# Patient Record
Sex: MALE | Race: WHITE | HISPANIC OR LATINO | Employment: FULL TIME | URBAN - METROPOLITAN AREA
[De-identification: names, ages, dates, MRNs, and addresses within clinical notes are randomized per-mention and may not be internally consistent; named-entity substitution may affect disease eponyms.]

---

## 2024-03-14 ENCOUNTER — APPOINTMENT (OUTPATIENT)
Dept: CT IMAGING | Facility: HOSPITAL | Age: 64
End: 2024-03-14
Payer: MEDICAID

## 2024-03-14 ENCOUNTER — HOSPITAL ENCOUNTER (EMERGENCY)
Facility: HOSPITAL | Age: 64
Discharge: HOME | End: 2024-03-14
Attending: EMERGENCY MEDICINE | Admitting: EMERGENCY MEDICINE
Payer: MEDICAID

## 2024-03-14 VITALS
WEIGHT: 149.69 LBS | SYSTOLIC BLOOD PRESSURE: 140 MMHG | RESPIRATION RATE: 18 BRPM | DIASTOLIC BLOOD PRESSURE: 86 MMHG | HEART RATE: 77 BPM | OXYGEN SATURATION: 97 % | BODY MASS INDEX: 24.06 KG/M2 | TEMPERATURE: 98.3 F | HEIGHT: 66 IN

## 2024-03-14 DIAGNOSIS — R10.13 EPIGASTRIC PAIN: Primary | ICD-10-CM

## 2024-03-14 DIAGNOSIS — K80.20 GALLSTONES: ICD-10-CM

## 2024-03-14 LAB
ALBUMIN SERPL-MCNC: 4.7 G/DL (ref 3.5–5.2)
ALBUMIN/GLOB SERPL: 1.7 G/DL
ALP SERPL-CCNC: 79 U/L (ref 39–117)
ALT SERPL W P-5'-P-CCNC: 18 U/L (ref 1–41)
ANION GAP SERPL CALCULATED.3IONS-SCNC: 15 MMOL/L (ref 5–15)
AST SERPL-CCNC: 28 U/L (ref 1–40)
BASOPHILS # BLD AUTO: 0.04 10*3/MM3 (ref 0–0.2)
BASOPHILS NFR BLD AUTO: 0.3 % (ref 0–1.5)
BILIRUB SERPL-MCNC: 0.7 MG/DL (ref 0–1.2)
BUN SERPL-MCNC: 17 MG/DL (ref 8–23)
BUN/CREAT SERPL: 19.3 (ref 7–25)
CALCIUM SPEC-SCNC: 9.4 MG/DL (ref 8.6–10.5)
CHLORIDE SERPL-SCNC: 98 MMOL/L (ref 98–107)
CO2 SERPL-SCNC: 28 MMOL/L (ref 22–29)
CREAT SERPL-MCNC: 0.88 MG/DL (ref 0.76–1.27)
DEPRECATED RDW RBC AUTO: 39.2 FL (ref 37–54)
EGFRCR SERPLBLD CKD-EPI 2021: 96 ML/MIN/1.73
EOSINOPHIL # BLD AUTO: 0.02 10*3/MM3 (ref 0–0.4)
EOSINOPHIL NFR BLD AUTO: 0.2 % (ref 0.3–6.2)
ERYTHROCYTE [DISTWIDTH] IN BLOOD BY AUTOMATED COUNT: 13.1 % (ref 12.3–15.4)
GLOBULIN UR ELPH-MCNC: 2.8 GM/DL
GLUCOSE SERPL-MCNC: 117 MG/DL (ref 65–99)
HCT VFR BLD AUTO: 47.2 % (ref 37.5–51)
HGB BLD-MCNC: 15.2 G/DL (ref 13–17.7)
HOLD SPECIMEN: NORMAL
IMM GRANULOCYTES # BLD AUTO: 0.06 10*3/MM3 (ref 0–0.05)
IMM GRANULOCYTES NFR BLD AUTO: 0.5 % (ref 0–0.5)
LIPASE SERPL-CCNC: 23 U/L (ref 13–60)
LYMPHOCYTES # BLD AUTO: 1.39 10*3/MM3 (ref 0.7–3.1)
LYMPHOCYTES NFR BLD AUTO: 10.7 % (ref 19.6–45.3)
MCH RBC QN AUTO: 26.6 PG (ref 26.6–33)
MCHC RBC AUTO-ENTMCNC: 32.2 G/DL (ref 31.5–35.7)
MCV RBC AUTO: 82.7 FL (ref 79–97)
MONOCYTES # BLD AUTO: 0.8 10*3/MM3 (ref 0.1–0.9)
MONOCYTES NFR BLD AUTO: 6.1 % (ref 5–12)
NEUTROPHILS NFR BLD AUTO: 10.73 10*3/MM3 (ref 1.7–7)
NEUTROPHILS NFR BLD AUTO: 82.2 % (ref 42.7–76)
NRBC BLD AUTO-RTO: 0 /100 WBC (ref 0–0.2)
PLATELET # BLD AUTO: 234 10*3/MM3 (ref 140–450)
PMV BLD AUTO: 9 FL (ref 6–12)
POTASSIUM SERPL-SCNC: 3.5 MMOL/L (ref 3.5–5.2)
PROT SERPL-MCNC: 7.5 G/DL (ref 6–8.5)
RBC # BLD AUTO: 5.71 10*6/MM3 (ref 4.14–5.8)
SODIUM SERPL-SCNC: 141 MMOL/L (ref 136–145)
WBC NRBC COR # BLD AUTO: 13.04 10*3/MM3 (ref 3.4–10.8)

## 2024-03-14 PROCEDURE — 83690 ASSAY OF LIPASE: CPT | Performed by: NURSE PRACTITIONER

## 2024-03-14 PROCEDURE — 74176 CT ABD & PELVIS W/O CONTRAST: CPT

## 2024-03-14 PROCEDURE — 96374 THER/PROPH/DIAG INJ IV PUSH: CPT

## 2024-03-14 PROCEDURE — 85025 COMPLETE CBC W/AUTO DIFF WBC: CPT | Performed by: NURSE PRACTITIONER

## 2024-03-14 PROCEDURE — 99284 EMERGENCY DEPT VISIT MOD MDM: CPT

## 2024-03-14 PROCEDURE — 96375 TX/PRO/DX INJ NEW DRUG ADDON: CPT

## 2024-03-14 PROCEDURE — 25010000002 KETOROLAC TROMETHAMINE PER 15 MG: Performed by: PHYSICIAN ASSISTANT

## 2024-03-14 PROCEDURE — 80053 COMPREHEN METABOLIC PANEL: CPT | Performed by: NURSE PRACTITIONER

## 2024-03-14 PROCEDURE — 93005 ELECTROCARDIOGRAM TRACING: CPT | Performed by: PHYSICIAN ASSISTANT

## 2024-03-14 RX ORDER — KETOROLAC TROMETHAMINE 30 MG/ML
15 INJECTION, SOLUTION INTRAMUSCULAR; INTRAVENOUS ONCE
Status: COMPLETED | OUTPATIENT
Start: 2024-03-14 | End: 2024-03-14

## 2024-03-14 RX ORDER — OMEPRAZOLE 40 MG/1
40 CAPSULE, DELAYED RELEASE ORAL DAILY
Qty: 14 CAPSULE | Refills: 0 | Status: SHIPPED | OUTPATIENT
Start: 2024-03-14

## 2024-03-14 RX ORDER — ONDANSETRON 2 MG/ML
4 INJECTION INTRAMUSCULAR; INTRAVENOUS ONCE
Status: DISCONTINUED | OUTPATIENT
Start: 2024-03-14 | End: 2024-03-14

## 2024-03-14 RX ORDER — FAMOTIDINE 10 MG/ML
20 INJECTION, SOLUTION INTRAVENOUS ONCE
Status: COMPLETED | OUTPATIENT
Start: 2024-03-14 | End: 2024-03-14

## 2024-03-14 RX ORDER — METOCLOPRAMIDE 5 MG/1
5 TABLET ORAL 3 TIMES DAILY PRN
Qty: 20 TABLET | Refills: 0 | Status: SHIPPED | OUTPATIENT
Start: 2024-03-14

## 2024-03-14 RX ORDER — SODIUM CHLORIDE 0.9 % (FLUSH) 0.9 %
10 SYRINGE (ML) INJECTION AS NEEDED
Status: DISCONTINUED | OUTPATIENT
Start: 2024-03-14 | End: 2024-03-14 | Stop reason: HOSPADM

## 2024-03-14 RX ADMIN — FAMOTIDINE 20 MG: 10 INJECTION INTRAVENOUS at 20:19

## 2024-03-14 RX ADMIN — KETOROLAC TROMETHAMINE 15 MG: 30 INJECTION, SOLUTION INTRAMUSCULAR at 20:27

## 2024-03-14 NOTE — ED PROVIDER NOTES
Subjective   Provider in Triage Note  63-year-old male presents with family English-speaking and is okay with family translating with complaints of epigastric right upper quadrant pain.  [place] but states in [place] did have an ultrasound showing gallstones.  Worse after eating.  Denies any vomiting diarrhea or fever.    DAUGHTER SIMRAN IS NURSE PRACTITIONER AND PROVIDES ADDITIONAL HX FOR PATIENT    Due to significant overcrowding in the emergency department patient was initially seen and evaluated in triage.  Provider in triage recommended patient placement in the treatment area to initiate therapy and movement to an ER bed as soon as possible.   Orders placed; medications will be deferred to main provider per protocol.        History of Present Illness  Reviewed Pit note and agree.  No reports of chest pain or shortness of breath.  No pertinent abdominal surgical history.  Patient's daughter states that his diet has not been that great since coming to the [facility]        Review of Systems   Constitutional: Negative.    Respiratory: Negative.     Cardiovascular: Negative.    Gastrointestinal:  Positive for abdominal pain. Negative for abdominal distention, blood in stool, constipation, diarrhea, nausea and vomiting.   Genitourinary:  Negative for decreased urine volume, difficulty urinating, dysuria, flank pain, frequency, hematuria and urgency.   Musculoskeletal:  Negative for back pain, neck pain and neck stiffness.   Skin: Negative.    Neurological: Negative.        No past medical history on file.    No Known Allergies    No past surgical history on file.    No family history on file.    Social History     Socioeconomic History    Marital status:            Objective   Physical Exam  Vitals and nursing note reviewed.   Constitutional:       General: He is not in acute distress.     Appearance: Normal appearance. He is well-developed. He is not ill-appearing, toxic-appearing  "or diaphoretic.   HENT:      Head: Normocephalic and atraumatic.      Mouth/Throat:      Mouth: Mucous membranes are moist.      Pharynx: Oropharynx is clear.   Eyes:      General: No scleral icterus.     Extraocular Movements: Extraocular movements intact.      Pupils: Pupils are equal, round, and reactive to light.   Cardiovascular:      Rate and Rhythm: Normal rate and regular rhythm.      Heart sounds: No murmur heard.     No friction rub. No gallop.   Pulmonary:      Effort: Pulmonary effort is normal. No tachypnea or accessory muscle usage.      Breath sounds: No stridor. No decreased breath sounds, wheezing, rhonchi or rales.   Chest:      Chest wall: No mass, deformity, tenderness or crepitus.   Abdominal:      General: Bowel sounds are normal.      Palpations: Abdomen is soft. There is no mass.      Tenderness: There is abdominal tenderness in the right upper quadrant and epigastric area. There is no right CVA tenderness, left CVA tenderness, guarding or rebound.      Hernia: No hernia is present.   Skin:     General: Skin is warm.      Capillary Refill: Capillary refill takes less than 2 seconds.      Findings: No rash.   Neurological:      General: No focal deficit present.      Mental Status: He is alert and oriented to person, place, and time.   Psychiatric:         Mood and Affect: Mood normal.         Behavior: Behavior normal.         Procedures           ED Course    /72   Pulse 62   Temp 98.3 °F (36.8 °C) (Oral)   Resp 18   Ht 167 cm (65.75\")   Wt 67.9 kg (149 lb 11.1 oz)   SpO2 97%   BMI 24.35 kg/m²   Medications   sodium chloride 0.9 % flush 10 mL (has no administration in time range)   famotidine (PEPCID) injection 20 mg (20 mg Intravenous Given 3/14/24 2019)   ketorolac (TORADOL) injection 15 mg (15 mg Intravenous Given 3/14/24 2027)     Labs Reviewed   COMPREHENSIVE METABOLIC PANEL - Abnormal; Notable for the following components:       Result Value    Glucose 117 (*)     All " other components within normal limits    Narrative:     GFR Normal >60  Chronic Kidney Disease <60  Kidney Failure <15     CBC WITH AUTO DIFFERENTIAL - Abnormal; Notable for the following components:    WBC 13.04 (*)     Neutrophil % 82.2 (*)     Lymphocyte % 10.7 (*)     Eosinophil % 0.2 (*)     Neutrophils, Absolute 10.73 (*)     Immature Grans, Absolute 0.06 (*)     All other components within normal limits   LIPASE - Normal   CBC AND DIFFERENTIAL    Narrative:     The following orders were created for panel order CBC & Differential.  Procedure                               Abnormality         Status                     ---------                               -----------         ------                     CBC Auto Differential[[id number]]        Abnormal            Final result                 Please view results for these tests on the individual orders.   EXTRA TUBES    Narrative:     The following orders were created for panel order Extra Tubes.  Procedure                               Abnormality         Status                     ---------                               -----------         ------                     Gold Top - SST[[id number]]                                   Final result                 Please view results for these tests on the individual orders.   GOLD TOP - SST     CT Abdomen Pelvis Without Contrast   Final Result   Impression:      1. Cholelithiasis. No CT evidence of acute cholecystitis or biliary tract obstruction.   2. No acute process seen within the abdomen or pelvis.                  Electronically Signed: Jameson Reese MD     3/14/2024 6:50 PM EDT     Workstation ID: [id number]                                                 Medical Decision Making    Differentials: Acute cholecystitis, intra-abdominal infection, bowel obstruction, ACS, gastroenteritis, pancreatitis     ;this list is not all inclusive and does not constitute the entirety of considered causes  Labs: As above  EKG:  Reviewed by myself interpreted by Dr. Shaikh shows sinus rhythm rate 69 left axis deviation old posterior infarct previous EKG not available  Radiology: My interpretation no obvious bowel obstruction on CT abdomen pelvis correlated with radiologist interpretation as below  CT Abdomen Pelvis Without Contrast   Final Result    Impression:    1. Cholelithiasis. No CT evidence of acute cholecystitis or biliary tract obstruction.    2. No acute process seen within the abdomen or pelvis.        Electronically Signed: Jameson Reese MD      3/14/2024 6:50 PM EDT      Workstation ID: [id number]     Disposition/Treatment:  Appropriate PPE was worn during exam and throughout all encounters with the patient.  Upon arrival to the ED patient was afebrile.  Nontoxic presented with complaints of epigastric and right upper quadrant abdominal pain.  IV was placed and labs were obtained.  Patient was initially seen by another provider in triage reassessed by myself and ED bed.  Patient presents with epigastric and right upper quadrant abdominal pain.  Patient has had the symptoms in the past was diagnosed with gallstones.  No reports of new fever, nausea, or vomiting.  EKG obtained which showed no acute STEMI.  Labs and imaging also obtained.    CBC showed white count of 13 no anemia.  Metabolic panel unremarkable including normal liver function test.  Lipase normal.  CT abdomen pelvis showed gallstones otherwise no acute infection or obstruction including acute cholecystitis.  Patient was given Pepcid and Toradol while in the ED (initially Dilaudid was ordered but patient declined) with resolution of pain.  Upon reassessment patient is resting comfortably findings were discussed with the patient and family at bedside there is no signs of acute abdomen or peritonitis on today's exam.  Patient will be discharged advised to follow-up with general surgery and GI for further management of his abdominal pain.  Patient will be given Reglan  and omeprazole for home.  All questions were answered.    This document is intended for medical expert use only. Reading of this document by patients and/or patient's family without participating medical staff guidance may result in misinterpretation and unintended morbidity.  Any interpretation of such data is the responsibility of the patient and/or family member responsible for the patient in concert with their primary or specialist providers, not to be left for sources of online searches such as [employer], [employer] or similar queries. Relying on these approaches to knowledge may result in misinterpretation, misguided goals of care and even death should patients or family members try recommendations outside of the realm of professional medical care in a supervised inpatient environment.       Problems Addressed:  Epigastric pain: complicated acute illness or injury  Gallstones: complicated acute illness or injury    Amount and/or Complexity of Data Reviewed  Labs: ordered. Decision-making details documented in ED Course.  Radiology: ordered. Decision-making details documented in ED Course.  ECG/medicine tests: ordered.    Risk  Prescription drug management.        Final diagnoses:   Epigastric pain   Gallstones       ED Disposition  ED Disposition       ED Disposition   Discharge    Condition   Stable    Comment   --               [facility] EMERGENCY DEPARTMENT  [address] [facility] [address] [phone]  Go to   If symptoms worsen    PATIENT CONNECTION - [facility] [address] [phone]  Call   If you do not have a primary care provider    Lynsey Vinson MD  [address] [address] [phone]    Schedule an appointment as soon as possible for a visit       [facility]  [address] [facility] [address] [phone]  Schedule an appointment as soon as possible for a visit            Medication List        New  Prescriptions      metoclopramide 5 MG tablet  Commonly known as: REGLAN  Take 1 tablet by mouth 3 (Three) Times a Day As Needed (nausea).     omeprazole 40 MG capsule  Commonly known as: priLOSEC  Take 1 capsule by mouth Daily.               Where to Get Your Medications        These medications were sent to [facility][id number] [place] [address] AT [facility] - [phone] [facility] [phone] [address] [place] [address]      Phone: [phone]   metoclopramide 5 MG tablet  omeprazole 40 MG capsule            Amie Mccall PA  03/14/24 0811

## 2024-03-14 NOTE — ED NOTES
Pt complaining of upper abdominal pain for 2 days; pt states pain is severe & he has history of gallbladder stones. Pt offered  services & declined. Family interpreting for pt at this time.

## 2024-03-15 LAB
QT INTERVAL: 395 MS
QTC INTERVAL: 424 MS

## 2024-03-15 NOTE — DISCHARGE INSTRUCTIONS
Take medication as directed.  Drink plenty of clear fluids    Avoid fried foods, spicy foods, fatty foods, dairy.    Follow-up with general surgery or gastroenterology for further evaluation management of your abdominal pain.    Follow-up with your primary care provider in 3-5 days.  If you do not have a primary care provider call [phone] for help in finding one, or you may follow up with [facility] at [phone].    Return to ED for any new or worsening symptoms